# Patient Record
Sex: MALE | Race: OTHER
[De-identification: names, ages, dates, MRNs, and addresses within clinical notes are randomized per-mention and may not be internally consistent; named-entity substitution may affect disease eponyms.]

---

## 2017-05-08 ENCOUNTER — HOSPITAL ENCOUNTER (EMERGENCY)
Dept: HOSPITAL 54 - ER | Age: 52
LOS: 1 days | Discharge: LEFT BEFORE DISCHARGE | End: 2017-05-09
Payer: MEDICAID

## 2017-05-08 VITALS — BODY MASS INDEX: 22.96 KG/M2 | WEIGHT: 155 LBS | HEIGHT: 69 IN

## 2017-05-08 DIAGNOSIS — I10: ICD-10-CM

## 2017-05-08 DIAGNOSIS — F32.9: ICD-10-CM

## 2017-05-08 DIAGNOSIS — F10.129: Primary | ICD-10-CM

## 2017-05-08 DIAGNOSIS — F39: ICD-10-CM

## 2017-05-08 DIAGNOSIS — J44.9: ICD-10-CM

## 2017-05-08 DIAGNOSIS — Z59.0: ICD-10-CM

## 2017-05-08 DIAGNOSIS — Z76.5: ICD-10-CM

## 2017-05-08 LAB
BASOPHILS # BLD AUTO: 0 /CMM (ref 0–0.2)
BASOPHILS NFR BLD AUTO: 0.6 % (ref 0–2)
EOSINOPHIL # BLD AUTO: 0 /CMM (ref 0–0.7)
EOSINOPHIL NFR BLD AUTO: 0.4 % (ref 0–6)
HCT VFR BLD AUTO: 47 % (ref 39–51)
HGB BLD-MCNC: 15.6 G/DL (ref 13.5–17.5)
LYMPHOCYTES NFR BLD AUTO: 1.6 /CMM (ref 0.8–4.8)
LYMPHOCYTES NFR BLD AUTO: 38.9 % (ref 20–44)
MCH RBC QN AUTO: 31 PG (ref 26–33)
MCHC RBC AUTO-ENTMCNC: 33 G/DL (ref 31–36)
MCV RBC AUTO: 95 FL (ref 80–96)
MONOCYTES NFR BLD AUTO: 0.3 /CMM (ref 0.1–1.3)
MONOCYTES NFR BLD AUTO: 7.9 % (ref 2–12)
NEUTROPHILS # BLD AUTO: 2.2 /CMM (ref 1.8–8.9)
NEUTROPHILS NFR BLD AUTO: 52.2 % (ref 43–81)
PLATELET # BLD AUTO: 243 /CMM (ref 150–450)
RBC # BLD AUTO: 4.97 MIL/UL (ref 4.5–6)
RDW COEFFICIENT OF VARIATION: 14.3 (ref 11.5–15)
WBC NRBC COR # BLD AUTO: 4.2 K/UL (ref 4.3–11)

## 2017-05-08 PROCEDURE — G0480 DRUG TEST DEF 1-7 CLASSES: HCPCS

## 2017-05-08 PROCEDURE — A4606 OXYGEN PROBE USED W OXIMETER: HCPCS

## 2017-05-08 PROCEDURE — Z7610: HCPCS

## 2017-05-08 SDOH — ECONOMIC STABILITY - HOUSING INSECURITY: HOMELESSNESS: Z59.0

## 2017-05-08 NOTE — NUR
TO BED 8 A 50 YO MALE BIBRA PER EMS "PATIENT WAS SEEN IN [facility]." PATIENT 
IS AAOX2, AM BULATORY WITH MINIMAL ASSISTANCE. VSS. INITIATED SAFETY MEASURES. 
AWAITING FOR ER MD GUEVARA.

## 2017-05-09 VITALS — DIASTOLIC BLOOD PRESSURE: 71 MMHG | SYSTOLIC BLOOD PRESSURE: 106 MMHG

## 2017-05-09 LAB
ALBUMIN SERPL BCP-MCNC: 3.9 G/DL (ref 3.4–5)
ALP SERPL-CCNC: 87 U/L (ref 46–116)
ALT SERPL W P-5'-P-CCNC: 36 U/L (ref 12–78)
APAP SERPL-MCNC: 0 UG/ML (ref 10–30)
AST SERPL W P-5'-P-CCNC: 49 U/L (ref 15–37)
BILIRUB DIRECT SERPL-MCNC: 0.2 MG/DL (ref 0–0.2)
BILIRUB SERPL-MCNC: 0.5 MG/DL (ref 0.2–1)
BUN SERPL-MCNC: 5 MG/DL (ref 7–18)
CALCIUM SERPL-MCNC: 7.6 MG/DL (ref 8.5–10.1)
CANNABINOIDS UR QL SCN: NEGATIVE
CHLORIDE SERPL-SCNC: 107 MMOL/L (ref 98–107)
CO2 SERPL-SCNC: 33 MMOL/L (ref 21–32)
CREAT SERPL-MCNC: 0.8 MG/DL (ref 0.6–1.3)
ETHANOL SERPL-MCNC: 418 MG/DL (ref 0–0)
GFR SERPLBLD BASED ON 1.73 SQ M-ARVRAT: 103 ML/MIN (ref 60–?)
GLUCOSE SERPL-MCNC: 109 MG/DL (ref 74–106)
PCP UR QL SCN: NEGATIVE
POTASSIUM SERPL-SCNC: 3.6 MMOL/L (ref 3.5–5.1)
PROT SERPL-MCNC: 7.2 G/DL (ref 6.4–8.2)
SALICYLATES SERPL-MCNC: 1.7 MG/DL (ref 2.8–20)
SODIUM SERPL-SCNC: 146 MMOL/L (ref 136–145)
TSH SERPL DL<=0.005 MIU/L-ACNC: 2.47 UIU/ML (ref 0.36–3.74)

## 2017-10-06 ENCOUNTER — HOSPITAL ENCOUNTER (EMERGENCY)
Dept: HOSPITAL 72 - EMR | Age: 52
Discharge: HOME | End: 2017-10-06
Payer: COMMERCIAL

## 2017-10-06 VITALS — SYSTOLIC BLOOD PRESSURE: 133 MMHG | DIASTOLIC BLOOD PRESSURE: 84 MMHG

## 2017-10-06 VITALS — HEIGHT: 70 IN | WEIGHT: 165 LBS | BODY MASS INDEX: 23.62 KG/M2

## 2017-10-06 DIAGNOSIS — F10.129: Primary | ICD-10-CM

## 2017-10-06 DIAGNOSIS — J45.909: ICD-10-CM

## 2017-10-06 PROCEDURE — 99282 EMERGENCY DEPT VISIT SF MDM: CPT

## 2017-10-06 NOTE — EMERGENCY ROOM REPORT
History of Present Illness


General


Chief Complaint:  Alcohol Intoxication


Source:  Patient, EMS





Present Illness


HPI


The patient is a 52-year-old male presenting for alcohol intoxication.  Brought 

in by EMS.  He denies any complaints.


Allergies:  


Coded Allergies:  


     No Known Allergies (Unverified , 12/17/14)


     UNABLE TO ASSESS (Unverified , 4/25/16)





Patient History


Past Medical History:  see triage record


Pertinent Family History:  none


Reviewed Nursing Documentation:  PMH: Agreed, PSxH: Agreed





Nursing Documentation-PMH


Hx Cardiac Problems:  No - SUBSTANCE ABUSE


Hx Asthma:  Yes


History Of Psychiatric Problem:  Yes


Hx Seizures:  Yes





Review of Systems


All Other Systems:  limited





Physical Exam





Vital Signs








  Date Time  Temp Pulse Resp B/P (MAP) Pulse Ox O2 Delivery O2 Flow Rate FiO2


 


10/6/17 17:04 99.1 117 16 149/95 96 Room Air  








Sp02 EP Interpretation:  reviewed, normal


General Appearance:  no apparent distress, alert, GCS 15, non-toxic


Head:  normocephalic, atraumatic


Eyes:  bilateral eye normal inspection, bilateral eye PERRL


ENT:  hearing grossly normal, normal pharynx, no angioedema, normal voice


Neck:  full range of motion, supple/symm/no masses


Respiratory:  chest non-tender, lungs clear, normal breath sounds, speaking 

full sentences


Cardiovascular #1:  regular rate, rhythm, no edema


Gastrointestinal:  normal bowel sounds, non tender, soft, non-distended, no 

guarding, no rebound


Musculoskeletal:  back normal, gait/station normal, normal range of motion, non-

tender


Neurologic:  normal gait


Psychiatric:  judgement/insight normal, memory normal, mood/affect normal, no 

suicidal/homicidal ideation


Skin:  normal color, no rash, warm/dry, well hydrated


Lymphatic:  no adenopathy





Medical Decision Making


PA Attestation


Dr. Harrell is my supervising physician. Patient management was discussed 

with my supervising physician


Diagnostic Impression:  


 Primary Impression:  


 Acute alcoholic intoxication


 Qualified Codes:  F10.929 - Alcohol use, unspecified with intoxication, 

unspecified


ER Course


The patient is a 52-year-old male presenting for alcohol intoxication





DDx considered but not limited to: acute alcohol intoxication, hepatic 

encephalopathy, drug overdose, hypoglycemia, psychosis





Physical exam: Vitals are stable. No apparent distress.  Patient is lethargic.


Head is normocephalic atraumatic.


Pupils are equally round and reactive to light


The patient is arousable by touch or name.


Lungs are clear to auscultation bilaterally.


No abnormal tenderness.  Abdomen is soft.


Otherwise exam is unremarkable





The patient is given time to rest in the emergency department.





The patient is able to ambulate well and is asking to leave at this time.  The 

patient is alert and oriented.





The patient be discharged home and given ER precautions.  Patient was given 

advice on alcohol addiction





Last Vital Signs








  Date Time  Temp Pulse Resp B/P (MAP) Pulse Ox O2 Delivery O2 Flow Rate FiO2


 


10/6/17 17:04 99.1 117 16 149/95 96 Room Air  








Status:  improved


Disposition:  HOME, SELF-CARE


Condition:  Improved


Referrals:  


[facility]ERR[facility] (PCP)


Patient Instructions:  Alcohol Intoxication





Additional Instructions:  


My findings were discussed with the patient. Patient was counseled to seek help 

for alcohol abuse. Patient is stable for discharge, is alert and oriented, and 

can ambulate without difficulty. Patient is asked to return to ED if he 

experiences chest pain, abdominal pain, dizziness, falls down, or for any 

reason.











MAKENNA CUENCA Oct 6, 2017 21:33

## 2017-12-26 ENCOUNTER — HOSPITAL ENCOUNTER (EMERGENCY)
Dept: HOSPITAL 87 - ER | Age: 52
LOS: 1 days | Discharge: HOME | End: 2017-12-27
Payer: MEDICAID

## 2017-12-26 VITALS — BODY MASS INDEX: 25.68 KG/M2 | WEIGHT: 189.6 LBS | HEIGHT: 72 IN
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
## 2017-12-26 DIAGNOSIS — F10.129: ICD-10-CM
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
## 2017-12-26 DIAGNOSIS — S00.81XA: Primary | ICD-10-CM

## 2017-12-26 PROCEDURE — 90715 TDAP VACCINE 7 YRS/> IM: CPT

## 2017-12-26 PROCEDURE — 70450 CT HEAD/BRAIN W/O DYE: CPT

## 2017-12-26 PROCEDURE — 71010: CPT

## 2017-12-26 PROCEDURE — 72170 X-RAY EXAM OF PELVIS: CPT

## 2017-12-26 PROCEDURE — 90471 IMMUNIZATION ADMIN: CPT

## 2017-12-26 PROCEDURE — 86901 BLOOD TYPING SEROLOGIC RH(D): CPT

## 2017-12-26 PROCEDURE — 96361 HYDRATE IV INFUSION ADD-ON: CPT

## 2017-12-26 PROCEDURE — 86900 BLOOD TYPING SEROLOGIC ABO: CPT

## 2017-12-26 PROCEDURE — 70486 CT MAXILLOFACIAL W/O DYE: CPT

## 2017-12-26 PROCEDURE — 72125 CT NECK SPINE W/O DYE: CPT

## 2017-12-26 PROCEDURE — 36415 COLL VENOUS BLD VENIPUNCTURE: CPT

## 2017-12-26 PROCEDURE — 85025 COMPLETE CBC W/AUTO DIFF WBC: CPT

## 2017-12-26 PROCEDURE — 80053 COMPREHEN METABOLIC PANEL: CPT

## 2017-12-26 PROCEDURE — 96360 HYDRATION IV INFUSION INIT: CPT

## 2017-12-26 PROCEDURE — 99285 EMERGENCY DEPT VISIT HI MDM: CPT

## 2017-12-26 PROCEDURE — 86850 RBC ANTIBODY SCREEN: CPT

## 2017-12-27 VITALS — SYSTOLIC BLOOD PRESSURE: 129 MMHG | DIASTOLIC BLOOD PRESSURE: 77 MMHG

## 2017-12-27 LAB
BASOPHILS NFR BLD AUTO: 0.6 % (ref 0–2)
CHLORIDE SERPL-SCNC: 114 MEQ/L (ref 98–107)
CO2 SERPL-SCNC: 26 MEQ/L (ref 21–32)
EOSINOPHIL NFR BLD AUTO: 1.6 % (ref 0–5)
ERYTHROCYTE [DISTWIDTH] IN BLOOD BY AUTOMATED COUNT: 14.2 % (ref 11.6–14.6)
ETHANOL SERPL-MCNC: 238 MG/DL
HCT VFR BLD AUTO: 45.3 % (ref 42–52)
HGB BLD-MCNC: 15.8 G/DL (ref 14–18)
LYMPHOCYTES NFR BLD AUTO: 31.6 % (ref 20–50)
MCH RBC QN AUTO: 32.2 PG (ref 28–32)
MCV RBC AUTO: 92.2 FL (ref 80–94)
MONOCYTES NFR BLD AUTO: 7.4 % (ref 2–8)
NEUTROPHILS NFR BLD AUTO: 58.8 % (ref 40–76)
PLATELET # BLD AUTO: 238 X1000/UL (ref 130–400)
PMV BLD AUTO: 7.7 FL (ref 7.4–10.4)
RBC # BLD AUTO: 4.91 MILL/UL (ref 4.7–6.1)

## 2017-12-27 RX ADMIN — TETANUS TOXOID, REDUCED DIPHTHERIA TOXOID AND ACELLULAR PERTUSSIS VACCINE, ADSORBED ONE ML: 5; 2.5; 8; 8; 2.5 SUSPENSION INTRAMUSCULAR at 04:35

## 2017-12-27 RX ADMIN — DEXTROSE ONE MLS/HR: 5 SOLUTION INTRAVENOUS at 04:35

## 2018-01-12 ENCOUNTER — HOSPITAL ENCOUNTER (EMERGENCY)
Dept: HOSPITAL 54 - ER | Age: 53
LOS: 1 days | Discharge: HOME | End: 2018-01-13
Payer: COMMERCIAL

## 2018-01-12 VITALS — BODY MASS INDEX: 26.6 KG/M2 | WEIGHT: 190 LBS | HEIGHT: 71 IN

## 2018-01-12 DIAGNOSIS — R45.851: Primary | ICD-10-CM

## 2018-01-12 DIAGNOSIS — F10.129: ICD-10-CM

## 2018-01-12 DIAGNOSIS — J44.9: ICD-10-CM

## 2018-01-12 DIAGNOSIS — F19.10: ICD-10-CM

## 2018-01-12 DIAGNOSIS — I10: ICD-10-CM

## 2018-01-12 DIAGNOSIS — J45.909: ICD-10-CM

## 2018-01-12 DIAGNOSIS — F32.9: ICD-10-CM

## 2018-01-12 PROCEDURE — 99284 EMERGENCY DEPT VISIT MOD MDM: CPT

## 2018-01-12 PROCEDURE — 80048 BASIC METABOLIC PNL TOTAL CA: CPT

## 2018-01-12 PROCEDURE — 81001 URINALYSIS AUTO W/SCOPE: CPT

## 2018-01-12 PROCEDURE — A4606 OXYGEN PROBE USED W OXIMETER: HCPCS

## 2018-01-12 PROCEDURE — G0480 DRUG TEST DEF 1-7 CLASSES: HCPCS

## 2018-01-12 PROCEDURE — 80329 ANALGESICS NON-OPIOID 1 OR 2: CPT

## 2018-01-12 PROCEDURE — Z7610: HCPCS

## 2018-01-12 PROCEDURE — 85025 COMPLETE CBC W/AUTO DIFF WBC: CPT

## 2018-01-12 PROCEDURE — 80076 HEPATIC FUNCTION PANEL: CPT

## 2018-01-12 PROCEDURE — 36415 COLL VENOUS BLD VENIPUNCTURE: CPT

## 2018-01-12 PROCEDURE — 80305 DRUG TEST PRSMV DIR OPT OBS: CPT

## 2018-01-12 NOTE — NUR
TO BED 15 A 51 YO MALE PATIENT BIBRA FOR ETOH AND SI " TO HANG HIMSELF." VSS. 
NAD NOTED. NONDIAPHORETIC. COMFORT AND SAFETY MEASURES IN PLACE. ONGOING VS AND 
CARDIAC MONITORING.

## 2018-01-13 VITALS — SYSTOLIC BLOOD PRESSURE: 134 MMHG | DIASTOLIC BLOOD PRESSURE: 77 MMHG

## 2018-01-13 LAB
ALBUMIN SERPL BCP-MCNC: 3.3 G/DL (ref 3.4–5)
ALP SERPL-CCNC: 101 U/L (ref 46–116)
ALT SERPL W P-5'-P-CCNC: 35 U/L (ref 12–78)
APAP SERPL-MCNC: 0 UG/ML (ref 10–30)
APPEARANCE UR: CLEAR
AST SERPL W P-5'-P-CCNC: 56 U/L (ref 15–37)
BASOPHILS # BLD AUTO: 0 /CMM (ref 0–0.2)
BASOPHILS NFR BLD AUTO: 0.2 % (ref 0–2)
BILIRUB DIRECT SERPL-MCNC: 0.1 MG/DL (ref 0–0.2)
BILIRUB SERPL-MCNC: 0.4 MG/DL (ref 0.2–1)
BILIRUB UR QL STRIP: NEGATIVE
BUN SERPL-MCNC: 7 MG/DL (ref 7–18)
CALCIUM SERPL-MCNC: 8 MG/DL (ref 8.5–10.1)
CHLORIDE SERPL-SCNC: 106 MMOL/L (ref 98–107)
CO2 SERPL-SCNC: 30 MMOL/L (ref 21–32)
COLOR UR: YELLOW
CREAT SERPL-MCNC: 0.7 MG/DL (ref 0.6–1.3)
EOSINOPHIL # BLD AUTO: 0 /CMM (ref 0–0.7)
EOSINOPHIL NFR BLD AUTO: 0.5 % (ref 0–6)
ETHANOL SERPL-MCNC: 439 MG/DL (ref 0–0)
GLUCOSE SERPL-MCNC: 108 MG/DL (ref 74–106)
GLUCOSE UR STRIP-MCNC: NEGATIVE MG/DL
HCT VFR BLD AUTO: 40 % (ref 39–51)
HGB BLD-MCNC: 14.2 G/DL (ref 13.5–17.5)
HGB UR QL STRIP: NEGATIVE ERY/UL
KETONES UR STRIP-MCNC: NEGATIVE MG/DL
LEUKOCYTE ESTERASE UR QL STRIP: NEGATIVE
LYMPHOCYTES NFR BLD AUTO: 1.2 /CMM (ref 0.8–4.8)
LYMPHOCYTES NFR BLD AUTO: 24.4 % (ref 20–44)
MCH RBC QN AUTO: 32 PG (ref 26–33)
MCHC RBC AUTO-ENTMCNC: 35 G/DL (ref 31–36)
MCV RBC AUTO: 92 FL (ref 80–96)
MONOCYTES NFR BLD AUTO: 0.5 /CMM (ref 0.1–1.3)
MONOCYTES NFR BLD AUTO: 10.1 % (ref 2–12)
NEUTROPHILS # BLD AUTO: 3.1 /CMM (ref 1.8–8.9)
NEUTROPHILS NFR BLD AUTO: 64.8 % (ref 43–81)
NITRITE UR QL STRIP: NEGATIVE
PH UR STRIP: 6 [PH] (ref 5–8)
PLATELET # BLD AUTO: 235 /CMM (ref 150–450)
POTASSIUM SERPL-SCNC: 3.3 MMOL/L (ref 3.5–5.1)
PROT SERPL-MCNC: 6.6 G/DL (ref 6.4–8.2)
PROT UR QL STRIP: NEGATIVE MG/DL
RBC # BLD AUTO: 4.39 MIL/UL (ref 4.5–6)
RDW COEFFICIENT OF VARIATION: 13.7 (ref 11.5–15)
SALICYLATES SERPL-MCNC: 1.8 MG/DL (ref 2.8–20)
SODIUM SERPL-SCNC: 145 MMOL/L (ref 136–145)
UROBILINOGEN UR STRIP-MCNC: 0.2 EU/DL
WBC NRBC COR # BLD AUTO: 4.7 K/UL (ref 4.3–11)

## 2018-01-13 NOTE — NUR
PT AMBULATORY WITH STEADY GAIT TO THE RESTROOM, DENIES SUICIDAL OR HOMICIDAL 
IDEATION AT THIS TIME. PT REPORTS THAT HE WAS JUST DRINKING TOO MUCH LAST NIGHT 
AND DOES NOT FEEL DEPRESSED. MD MADE AWARE

## 2018-01-13 NOTE — NUR
PT SLEEPING BREATHING LOUDLY/EVEN, ARROUSABLE TO VOICE, ADMITS TO ETOH 
CONSUMPTION, NO C/O PAIN AT THIS TIME, WILL CONTINUE TO MONITOR

## 2018-01-13 NOTE — NUR
pt sleeping, breathing loudly/even/unlabored, arrousable to voice, no c/o pain 
at this time, nad noted, continue to monitor

## 2018-01-15 ENCOUNTER — HOSPITAL ENCOUNTER (EMERGENCY)
Dept: HOSPITAL 54 - ER | Age: 53
LOS: 1 days | Discharge: HOME | End: 2018-01-16
Payer: COMMERCIAL

## 2018-01-15 VITALS — WEIGHT: 200 LBS | BODY MASS INDEX: 28.63 KG/M2 | HEIGHT: 70 IN

## 2018-01-15 DIAGNOSIS — Z76.5: ICD-10-CM

## 2018-01-15 DIAGNOSIS — J44.9: ICD-10-CM

## 2018-01-15 DIAGNOSIS — F32.9: ICD-10-CM

## 2018-01-15 DIAGNOSIS — J45.909: ICD-10-CM

## 2018-01-15 DIAGNOSIS — I10: ICD-10-CM

## 2018-01-15 DIAGNOSIS — F10.229: Primary | ICD-10-CM

## 2018-01-15 LAB
ALBUMIN SERPL BCP-MCNC: 3.4 G/DL (ref 3.4–5)
ALP SERPL-CCNC: 99 U/L (ref 46–116)
ALT SERPL W P-5'-P-CCNC: 39 U/L (ref 12–78)
APAP SERPL-MCNC: 0 UG/ML (ref 10–30)
APTT PPP: 30 SEC (ref 23–34)
AST SERPL W P-5'-P-CCNC: 55 U/L (ref 15–37)
BASOPHILS # BLD AUTO: 0 /CMM (ref 0–0.2)
BASOPHILS NFR BLD AUTO: 0.8 % (ref 0–2)
BILIRUB DIRECT SERPL-MCNC: 0.2 MG/DL (ref 0–0.2)
BILIRUB SERPL-MCNC: 0.5 MG/DL (ref 0.2–1)
BUN SERPL-MCNC: 8 MG/DL (ref 7–18)
CALCIUM SERPL-MCNC: 8 MG/DL (ref 8.5–10.1)
CHLORIDE SERPL-SCNC: 109 MMOL/L (ref 98–107)
CO2 SERPL-SCNC: 28 MMOL/L (ref 21–32)
CREAT SERPL-MCNC: 0.7 MG/DL (ref 0.6–1.3)
EOSINOPHIL # BLD AUTO: 0 /CMM (ref 0–0.7)
EOSINOPHIL NFR BLD AUTO: 0.9 % (ref 0–6)
ETHANOL SERPL-MCNC: 494 MG/DL (ref 0–0)
GLUCOSE SERPL-MCNC: 98 MG/DL (ref 74–106)
HCT VFR BLD AUTO: 46 % (ref 39–51)
HGB BLD-MCNC: 16 G/DL (ref 13.5–17.5)
INR PPP: 0.9 (ref 0.87–1.13)
LYMPHOCYTES NFR BLD AUTO: 1.2 /CMM (ref 0.8–4.8)
LYMPHOCYTES NFR BLD AUTO: 33.4 % (ref 20–44)
MCH RBC QN AUTO: 32 PG (ref 26–33)
MCHC RBC AUTO-ENTMCNC: 35 G/DL (ref 31–36)
MCV RBC AUTO: 92 FL (ref 80–96)
MONOCYTES NFR BLD AUTO: 0.3 /CMM (ref 0.1–1.3)
MONOCYTES NFR BLD AUTO: 8.1 % (ref 2–12)
NEUTROPHILS # BLD AUTO: 2.1 /CMM (ref 1.8–8.9)
NEUTROPHILS NFR BLD AUTO: 56.8 % (ref 43–81)
PH UR STRIP: 5.5 [PH] (ref 5–8)
PLATELET # BLD AUTO: 255 /CMM (ref 150–450)
POTASSIUM SERPL-SCNC: 3.8 MMOL/L (ref 3.5–5.1)
PROT SERPL-MCNC: 7.1 G/DL (ref 6.4–8.2)
RBC # BLD AUTO: 4.99 MIL/UL (ref 4.5–6)
RDW COEFFICIENT OF VARIATION: 14.1 (ref 11.5–15)
SALICYLATES SERPL-MCNC: 1.5 MG/DL (ref 2.8–20)
SODIUM SERPL-SCNC: 146 MMOL/L (ref 136–145)
TSH SERPL DL<=0.005 MIU/L-ACNC: 1.5 UIU/ML (ref 0.36–3.74)
UROBILINOGEN UR STRIP-MCNC: 0.2 EU/DL
WBC NRBC COR # BLD AUTO: 3.6 K/UL (ref 4.3–11)

## 2018-01-15 PROCEDURE — G0480 DRUG TEST DEF 1-7 CLASSES: HCPCS

## 2018-01-15 PROCEDURE — Z7610: HCPCS

## 2018-01-15 PROCEDURE — 99285 EMERGENCY DEPT VISIT HI MDM: CPT

## 2018-01-15 PROCEDURE — 85025 COMPLETE CBC W/AUTO DIFF WBC: CPT

## 2018-01-15 PROCEDURE — A4606 OXYGEN PROBE USED W OXIMETER: HCPCS

## 2018-01-15 PROCEDURE — 81001 URINALYSIS AUTO W/SCOPE: CPT

## 2018-01-15 PROCEDURE — 93005 ELECTROCARDIOGRAM TRACING: CPT

## 2018-01-15 PROCEDURE — 85730 THROMBOPLASTIN TIME PARTIAL: CPT

## 2018-01-15 PROCEDURE — 82962 GLUCOSE BLOOD TEST: CPT

## 2018-01-15 PROCEDURE — 96360 HYDRATION IV INFUSION INIT: CPT

## 2018-01-15 PROCEDURE — 71045 X-RAY EXAM CHEST 1 VIEW: CPT

## 2018-01-15 PROCEDURE — 80076 HEPATIC FUNCTION PANEL: CPT

## 2018-01-15 PROCEDURE — 70450 CT HEAD/BRAIN W/O DYE: CPT

## 2018-01-15 PROCEDURE — 80329 ANALGESICS NON-OPIOID 1 OR 2: CPT

## 2018-01-15 PROCEDURE — 84484 ASSAY OF TROPONIN QUANT: CPT

## 2018-01-15 PROCEDURE — 36415 COLL VENOUS BLD VENIPUNCTURE: CPT

## 2018-01-15 PROCEDURE — 80305 DRUG TEST PRSMV DIR OPT OBS: CPT

## 2018-01-15 PROCEDURE — 84443 ASSAY THYROID STIM HORMONE: CPT

## 2018-01-15 PROCEDURE — 80048 BASIC METABOLIC PNL TOTAL CA: CPT

## 2018-01-15 PROCEDURE — 70486 CT MAXILLOFACIAL W/O DYE: CPT

## 2018-01-15 NOTE — NUR
51 YO MALE BB SELF. PATIENT IS A/O X 3, C/O ALCOHOL INTOXICATION. PATIENT 
ASSISTED TO ER BED, SKIN WARM AND DRY, RESP EVEN AND UNLABORED. PATIENT 
GOWNED,PLACED ON CARDIAC MONITOR. AWAITING OERDERS FROM PROVIDER, WILL CONTINUE 
TO MONITOR

## 2018-01-15 NOTE — NUR
PT SLEEPING/SNORING LOUDLY/EVEN, ARROUSABLE TO VOICE, A/O X3 IV TO R AC WITH NS 
RUNNING, CARDIAC MONITOR IN PLACE NSR,

## 2018-01-16 VITALS — DIASTOLIC BLOOD PRESSURE: 87 MMHG | SYSTOLIC BLOOD PRESSURE: 127 MMHG

## 2018-01-16 NOTE — NUR
PT AMBULATED THROUGHTOUT ER, STEADY GAIT, PT EDUCATED ON RISK OF ALCHOL 
WITHDRAWL AND ENCOURAGED TO SEEK HELP AT [facility], LIST PROVIDED.

## 2019-09-07 ENCOUNTER — HOSPITAL ENCOUNTER (EMERGENCY)
Dept: HOSPITAL 54 - ER | Age: 54
LOS: 1 days | Discharge: HOME | End: 2019-09-08
Payer: COMMERCIAL

## 2019-09-07 VITALS — WEIGHT: 195 LBS | HEIGHT: 72 IN | BODY MASS INDEX: 26.41 KG/M2

## 2019-09-07 DIAGNOSIS — F10.10: ICD-10-CM

## 2019-09-07 DIAGNOSIS — F32.9: ICD-10-CM

## 2019-09-07 DIAGNOSIS — R56.9: ICD-10-CM

## 2019-09-07 DIAGNOSIS — J44.9: ICD-10-CM
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
## 2019-09-07 DIAGNOSIS — Z60.2: ICD-10-CM

## 2019-09-07 DIAGNOSIS — R45.851: Primary | ICD-10-CM

## 2019-09-07 DIAGNOSIS — I10: ICD-10-CM

## 2019-09-07 LAB
ALBUMIN SERPL BCP-MCNC: 3.5 G/DL (ref 3.4–5)
ALP SERPL-CCNC: 72 U/L (ref 46–116)
ALT SERPL W P-5'-P-CCNC: 57 U/L (ref 12–78)
APAP SERPL-MCNC: 1 UG/ML (ref 10–30)
AST SERPL W P-5'-P-CCNC: 46 U/L (ref 15–37)
BASOPHILS # BLD AUTO: 0.1 /CMM (ref 0–0.2)
BASOPHILS NFR BLD AUTO: 1.5 % (ref 0–2)
BILIRUB DIRECT SERPL-MCNC: 0.4 MG/DL (ref 0–0.2)
BILIRUB SERPL-MCNC: 1.1 MG/DL (ref 0.2–1)
BILIRUB UR QL STRIP: (no result)
BUN SERPL-MCNC: 10 MG/DL (ref 7–18)
CALCIUM SERPL-MCNC: 8.1 MG/DL (ref 8.5–10.1)
CHLORIDE SERPL-SCNC: 105 MMOL/L (ref 98–107)
CO2 SERPL-SCNC: 30 MMOL/L (ref 21–32)
CREAT SERPL-MCNC: 0.8 MG/DL (ref 0.6–1.3)
EOSINOPHIL NFR BLD AUTO: 1.5 % (ref 0–6)
ETHANOL SERPL-MCNC: 386 MG/DL (ref 0–0)
GLUCOSE SERPL-MCNC: 79 MG/DL (ref 74–106)
HCT VFR BLD AUTO: 50 % (ref 39–51)
HGB BLD-MCNC: 16.8 G/DL (ref 13.5–17.5)
KETONES UR STRIP-MCNC: 15 MG/DL
LYMPHOCYTES NFR BLD AUTO: 2.2 /CMM (ref 0.8–4.8)
LYMPHOCYTES NFR BLD AUTO: 42 % (ref 20–44)
MCHC RBC AUTO-ENTMCNC: 34 G/DL (ref 31–36)
MCV RBC AUTO: 94 FL (ref 80–96)
MONOCYTES NFR BLD AUTO: 0.3 /CMM (ref 0.1–1.3)
MONOCYTES NFR BLD AUTO: 6 % (ref 2–12)
NEUTROPHILS # BLD AUTO: 2.6 /CMM (ref 1.8–8.9)
NEUTROPHILS NFR BLD AUTO: 49 % (ref 43–81)
PH UR STRIP: 6 [PH] (ref 5–8)
PLATELET # BLD AUTO: 263 /CMM (ref 150–450)
POTASSIUM SERPL-SCNC: 3.8 MMOL/L (ref 3.5–5.1)
PROT SERPL-MCNC: 6.2 G/DL (ref 6.4–8.2)
RBC # BLD AUTO: 5.32 MIL/UL (ref 4.5–6)
RBC #/AREA URNS HPF: (no result) /HPF (ref 0–2)
SALICYLATES SERPL-MCNC: 2 MG/DL (ref 2.8–20)
SODIUM SERPL-SCNC: 145 MMOL/L (ref 136–145)
UROBILINOGEN UR STRIP-MCNC: 2 EU/DL
WBC #/AREA URNS HPF: (no result) /HPF (ref 0–3)
WBC NRBC COR # BLD AUTO: 5.2 K/UL (ref 4.3–11)

## 2019-09-07 PROCEDURE — G0480 DRUG TEST DEF 1-7 CLASSES: HCPCS

## 2019-09-07 PROCEDURE — 80048 BASIC METABOLIC PNL TOTAL CA: CPT

## 2019-09-07 PROCEDURE — 80076 HEPATIC FUNCTION PANEL: CPT

## 2019-09-07 PROCEDURE — 85025 COMPLETE CBC W/AUTO DIFF WBC: CPT

## 2019-09-07 PROCEDURE — 80305 DRUG TEST PRSMV DIR OPT OBS: CPT

## 2019-09-07 PROCEDURE — 96374 THER/PROPH/DIAG INJ IV PUSH: CPT

## 2019-09-07 PROCEDURE — 80307 DRUG TEST PRSMV CHEM ANLYZR: CPT

## 2019-09-07 PROCEDURE — 36415 COLL VENOUS BLD VENIPUNCTURE: CPT

## 2019-09-07 PROCEDURE — 99284 EMERGENCY DEPT VISIT MOD MDM: CPT

## 2019-09-07 PROCEDURE — 80329 ANALGESICS NON-OPIOID 1 OR 2: CPT

## 2019-09-07 PROCEDURE — 96375 TX/PRO/DX INJ NEW DRUG ADDON: CPT

## 2019-09-07 PROCEDURE — 81001 URINALYSIS AUTO W/SCOPE: CPT

## 2019-09-07 SDOH — SOCIAL STABILITY - SOCIAL INSECURITY: PROBLEMS RELATED TO LIVING ALONE: Z60.2

## 2019-09-07 NOTE — NUR
Pt to er c/o si. +etoh. Pt vital signs stable. No immediate signs of distress 
noted. si precautions implemented. Will cont to monitor pt.

## 2019-09-08 VITALS — DIASTOLIC BLOOD PRESSURE: 67 MMHG | SYSTOLIC BLOOD PRESSURE: 122 MMHG

## 2019-09-08 NOTE — NUR
Pt easily arousable. Pt admits to drinking alcohol heavily last pm. Pt now 
denies any si. Will cont to monitor pt.;

## 2019-09-08 NOTE — NUR
[place] sleeping in [place]. No signs of distress noted. Pt vital signs stable. will 
cont to monitor pt.

## 2020-08-19 ENCOUNTER — HOSPITAL ENCOUNTER (EMERGENCY)
Dept: HOSPITAL 87 - ER | Age: 55
LOS: 1 days | Discharge: LEFT BEFORE DISCHARGE | End: 2020-08-20
Payer: MEDICAID

## 2020-08-19 VITALS — HEIGHT: 68 IN | BODY MASS INDEX: 27.4 KG/M2 | WEIGHT: 180.78 LBS
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
## 2020-08-19 DIAGNOSIS — F10.129: Primary | ICD-10-CM

## 2020-08-19 LAB
BASOPHILS NFR BLD AUTO: 0.6 % (ref 0–2)
CHLORIDE SERPL-SCNC: 105 MEQ/L (ref 98–107)
EOSINOPHIL NFR BLD AUTO: 0.3 % (ref 0–5)
ERYTHROCYTE [DISTWIDTH] IN BLOOD BY AUTOMATED COUNT: 13.8 % (ref 11.6–14.6)
ETHANOL SERPL-MCNC: 400 MG/DL
HCT VFR BLD AUTO: 50.5 % (ref 42–52)
HGB BLD-MCNC: 17.2 G/DL (ref 14–18)
LYMPHOCYTES NFR BLD AUTO: 31.2 % (ref 20–50)
MCH RBC QN AUTO: 31.3 PG (ref 28–32)
MCV RBC AUTO: 91.9 FL (ref 80–94)
MONOCYTES NFR BLD AUTO: 8.9 % (ref 2–8)
NEUTROPHILS NFR BLD AUTO: 59 % (ref 40–76)
PLATELET # BLD AUTO: 191 X1000/UL (ref 130–400)
PMV BLD AUTO: 7.2 FL (ref 7.4–10.4)
RBC # BLD AUTO: 5.49 MILL/UL (ref 4.7–6.1)

## 2020-08-19 PROCEDURE — 96365 THER/PROPH/DIAG IV INF INIT: CPT

## 2020-08-19 PROCEDURE — G0480 DRUG TEST DEF 1-7 CLASSES: HCPCS

## 2020-08-19 PROCEDURE — 80305 DRUG TEST PRSMV DIR OPT OBS: CPT

## 2020-08-19 PROCEDURE — 85025 COMPLETE CBC W/AUTO DIFF WBC: CPT

## 2020-08-19 PROCEDURE — 80329 ANALGESICS NON-OPIOID 1 OR 2: CPT

## 2020-08-19 PROCEDURE — 80053 COMPREHEN METABOLIC PANEL: CPT

## 2020-08-19 PROCEDURE — 71045 X-RAY EXAM CHEST 1 VIEW: CPT

## 2020-08-19 PROCEDURE — 96361 HYDRATE IV INFUSION ADD-ON: CPT

## 2020-08-19 PROCEDURE — 84443 ASSAY THYROID STIM HORMONE: CPT

## 2020-08-19 PROCEDURE — 70450 CT HEAD/BRAIN W/O DYE: CPT

## 2020-08-19 PROCEDURE — 80320 DRUG SCREEN QUANTALCOHOLS: CPT

## 2020-08-19 PROCEDURE — 80307 DRUG TEST PRSMV CHEM ANLYZR: CPT

## 2020-08-19 PROCEDURE — 99285 EMERGENCY DEPT VISIT HI MDM: CPT

## 2020-08-19 PROCEDURE — 96367 TX/PROPH/DG ADDL SEQ IV INF: CPT

## 2020-08-19 PROCEDURE — 82140 ASSAY OF AMMONIA: CPT

## 2020-08-19 PROCEDURE — 36415 COLL VENOUS BLD VENIPUNCTURE: CPT

## 2020-08-19 PROCEDURE — 83605 ASSAY OF LACTIC ACID: CPT

## 2020-08-19 PROCEDURE — 93005 ELECTROCARDIOGRAM TRACING: CPT

## 2020-08-19 PROCEDURE — 81003 URINALYSIS AUTO W/O SCOPE: CPT

## 2020-08-19 PROCEDURE — 96375 TX/PRO/DX INJ NEW DRUG ADDON: CPT

## 2020-08-20 VITALS — DIASTOLIC BLOOD PRESSURE: 79 MMHG | SYSTOLIC BLOOD PRESSURE: 122 MMHG

## 2020-08-20 LAB
AMPHETAMINES UR QL SCN: NEGATIVE
APPEARANCE UR: CLEAR
BARBITURATES UR QL SCN: NEGATIVE
BENZODIAZ UR QL SCN: NEGATIVE
BZE UR QL SCN: NEGATIVE
CANNABINOIDS UR QL SCN: NEGATIVE
COLOR UR: (no result)
HGB UR QL STRIP: NEGATIVE
KETONES UR STRIP-MCNC: NEGATIVE MG/DL
LEUKOCYTE ESTERASE UR QL STRIP: (no result)
METHADONE UR QL SCN: NEGATIVE
NITRITE UR QL STRIP: NEGATIVE
OPIATES UR QL SCN: NEGATIVE
PCP UR QL SCN: NEGATIVE
PH UR STRIP: 6 [PH] (ref 4.5–8)
PROT UR QL STRIP: (no result)
SP GR UR STRIP: 1.02 (ref 1–1.03)
UROBILINOGEN UR STRIP-MCNC: 2 E.U./DL (ref 0.2–1)